# Patient Record
(demographics unavailable — no encounter records)

---

## 2017-02-14 NOTE — ED
General Adult HPI





- General


Source: patient, RN notes reviewed, old records reviewed


Mode of arrival: ambulatory


Limitations: no limitations





<Alex Bynum - Last Filed: 02/14/17 06:49>





<Alex Parsons - Last Filed: 02/14/17 10:19>





- General


Chief complaint: Abdominal Pain


Stated complaint: uretal obstruction


Time Seen by Provider: 02/14/17 06:45





- History of Present Illness


Initial comments: 





This is a 23-year-old female here for evaluation.  Patient with a back and 

flank pain.  Patient does have history of ureteral blockage, right sided 

ureteral stricture, she has follow-up with Dr. Alicia.  Patient has no 

specific fevers, no abdominal pain, no nausea vomiting.  No diarrhea.  No 

modifying factors for symptoms.  No blood in her urine, able to urinate without 

difficulty (Alex Bynum)





- Related Data


 Home Medications











 Medication  Instructions  Recorded  Confirmed


 


HYDROcodone/APAP 7.5-325MG [Norco 1 tab PO DAILY PRN 02/14/17 02/14/17





7.5-325]   











 Allergies











Allergy/AdvReac Type Severity Reaction Status Date / Time


 


No Known Allergies Allergy   Verified 02/14/17 07:42














Review of Systems


ROS Other: All systems not noted in ROS Statement are negative.





<Alex Bynum - Last Filed: 02/14/17 06:49>


ROS Other: All systems not noted in ROS Statement are negative.





<Alex Parsons - Last Filed: 02/14/17 10:19>


ROS Statement: 


Those systems with pertinent positive or pertinent negative responses have been 

documented in the HPI.








Past Medical History


Additional Past Medical History / Comment(s): uretheral stricture, spontaneous 

pneumo


History of Any Multi-Drug Resistant Organisms: None Reported


Additional Past Surgical History / Comment(s): vats


Past Psychological History: No Psychological Hx Reported


Smoking Status: Never smoker


Past Alcohol Use History: Occasional


Past Drug Use History: None Reported





<Alex Bynum - Last Filed: 02/14/17 06:49>





General Exam


Limitations: no limitations





<Alex Bynum - Last Filed: 02/14/17 06:49>





Medical Decision Making





<Alex Bynum - Last Filed: 02/14/17 06:49>





- Lab Data


Result diagrams: 


 02/14/17 08:19





 02/14/17 08:19





<Alex Parsons - Last Filed: 02/14/17 10:19>





- Medical Decision Making





Spoke with Dr. Tate he was willing to accept the admission I admitted the 

patient to Dr. Alicia. (Alex Parsons)





- Lab Data





 Lab Results











  02/14/17 02/14/17 02/14/17 Range/Units





  06:35 06:35 08:19 


 


WBC     (3.8-10.6)  k/uL


 


RBC     (3.80-5.40)  m/uL


 


Hgb     (11.4-16.0)  gm/dL


 


Hct     (34.0-46.0)  %


 


MCV     (80.0-100.0)  fL


 


MCH     (25.0-35.0)  pg


 


MCHC     (31.0-37.0)  g/dL


 


RDW     (11.5-15.5)  %


 


Plt Count     (150-450)  k/uL


 


Neutrophils %     %


 


Lymphocytes %     %


 


Monocytes %     %


 


Eosinophils %     %


 


Basophils %     %


 


Neutrophils #     (1.3-7.7)  k/uL


 


Lymphocytes #     (1.0-4.8)  k/uL


 


Monocytes #     (0-1.0)  k/uL


 


Eosinophils #     (0-0.7)  k/uL


 


Basophils #     (0-0.2)  k/uL


 


Sodium    140  (137-145)  mmol/L


 


Potassium    4.2  (3.5-5.1)  mmol/L


 


Chloride    100  ()  mmol/L


 


Carbon Dioxide    25  (22-30)  mmol/L


 


Anion Gap    15  mmol/L


 


BUN    16  (7-17)  mg/dL


 


Creatinine    0.83  (0.52-1.04)  mg/dL


 


Est GFR (MDRD) Af Amer    >60  (>60 ml/min/1.73 sqM)  


 


Est GFR (MDRD) Non-Af    >60  (>60 ml/min/1.73 sqM)  


 


Glucose    97  (74-99)  mg/dL


 


Calcium    9.9  (8.4-10.2)  mg/dL


 


Total Bilirubin    0.9  (0.2-1.3)  mg/dL


 


AST    22  (14-36)  U/L


 


ALT    34  (9-52)  U/L


 


Alkaline Phosphatase    68  ()  U/L


 


Total Protein    7.6  (6.3-8.2)  g/dL


 


Albumin    4.6  (3.5-5.0)  g/dL


 


Amylase    31  ()  U/L


 


Lipase    30  ()  U/L


 


Urine Color  Light Yellow    


 


Urine Appearance  Turbid H    (Clear)  


 


Urine pH  7.5    (5.0-8.0)  


 


Ur Specific Gravity  1.013    (1.001-1.035)  


 


Urine Protein  2+ H    (Negative)  


 


Urine Glucose (UA)  Negative    (Negative)  


 


Urine Ketones  3+ H    (Negative)  


 


Urine Blood  Small H    (Negative)  


 


Urine Nitrate  Negative    (Negative)  


 


Urine Bilirubin  Negative    (Negative)  


 


Urine Urobilinogen  <2.0    (<2.0)  mg/dL


 


Ur Leukocyte Esterase  Large H    (Negative)  


 


Urine RBC  62 H    (0-5)  /hpf


 


Urine WBC  >182 H    (0-5)  /hpf


 


Ur Squamous Epith Cells  3    (0-4)  /hpf


 


Urine HCG, Qual   Not Detected   (Not Detectd)  














  02/14/17 Range/Units





  08:19 


 


WBC  10.8 H  (3.8-10.6)  k/uL


 


RBC  4.50  (3.80-5.40)  m/uL


 


Hgb  13.8  (11.4-16.0)  gm/dL


 


Hct  41.0  (34.0-46.0)  %


 


MCV  91.2  (80.0-100.0)  fL


 


MCH  30.6  (25.0-35.0)  pg


 


MCHC  33.5  (31.0-37.0)  g/dL


 


RDW  12.4  (11.5-15.5)  %


 


Plt Count  281  (150-450)  k/uL


 


Neutrophils %  90  %


 


Lymphocytes %  5  %


 


Monocytes %  4  %


 


Eosinophils %  0  %


 


Basophils %  0  %


 


Neutrophils #  9.7 H  (1.3-7.7)  k/uL


 


Lymphocytes #  0.5 L  (1.0-4.8)  k/uL


 


Monocytes #  0.4  (0-1.0)  k/uL


 


Eosinophils #  0.0  (0-0.7)  k/uL


 


Basophils #  0.0  (0-0.2)  k/uL


 


Sodium   (137-145)  mmol/L


 


Potassium   (3.5-5.1)  mmol/L


 


Chloride   ()  mmol/L


 


Carbon Dioxide   (22-30)  mmol/L


 


Anion Gap   mmol/L


 


BUN   (7-17)  mg/dL


 


Creatinine   (0.52-1.04)  mg/dL


 


Est GFR (MDRD) Af Amer   (>60 ml/min/1.73 sqM)  


 


Est GFR (MDRD) Non-Af   (>60 ml/min/1.73 sqM)  


 


Glucose   (74-99)  mg/dL


 


Calcium   (8.4-10.2)  mg/dL


 


Total Bilirubin   (0.2-1.3)  mg/dL


 


AST   (14-36)  U/L


 


ALT   (9-52)  U/L


 


Alkaline Phosphatase   ()  U/L


 


Total Protein   (6.3-8.2)  g/dL


 


Albumin   (3.5-5.0)  g/dL


 


Amylase   ()  U/L


 


Lipase   ()  U/L


 


Urine Color   


 


Urine Appearance   (Clear)  


 


Urine pH   (5.0-8.0)  


 


Ur Specific Gravity   (1.001-1.035)  


 


Urine Protein   (Negative)  


 


Urine Glucose (UA)   (Negative)  


 


Urine Ketones   (Negative)  


 


Urine Blood   (Negative)  


 


Urine Nitrate   (Negative)  


 


Urine Bilirubin   (Negative)  


 


Urine Urobilinogen   (<2.0)  mg/dL


 


Ur Leukocyte Esterase   (Negative)  


 


Urine RBC   (0-5)  /hpf


 


Urine WBC   (0-5)  /hpf


 


Ur Squamous Epith Cells   (0-4)  /hpf


 


Urine HCG, Qual   (Not Detectd)  














Disposition





<Alex Bynum - Last Filed: 02/14/17 06:49>


Time of Disposition: 10:19





<Alex Parsons - Last Filed: 02/14/17 10:19>


Clinical Impression: 


 Hydronephrosis, Urinary tract infection





Disposition: ADMITTED AS IP TO THIS HOSP

## 2017-02-14 NOTE — US
EXAMINATION TYPE: US renals and bladder

 

DATE OF EXAM: 2/14/2017 8:49 AM

 

COMPARISON: NONE

 

CLINICAL HISTORY: Pain. rt flank pain

 

EXAM MEASUREMENTS:

 

Right Kidney:  10.9 x 4.5 x 4.8 cm

Left Kidney: 12.5 x 5.6 x 4.6 cm

 

Findings:

 

Right Kidney: moderate/severe hydronephrosis.  Medial cystic lesion= 8.0 x 7.3 x 5.7 cm   

Left Kidney: wnl  

Bladder: distended, wnl

**Left Jet seen:

 

No nephrolithiasis.

 

IMPRESSION:

Moderate to severe right hydronephrosis. Largest cystic lesion involving the right kidney noted measu
ring 8 cm and appears to have a simple appearance.

## 2017-02-15 NOTE — P.GSHP
History of Present Illness


H&P Date: 17


Chief Complaint: Right flank pain





The patient is a 23-year-old white female with chronic right hydronephrosis, 

presumably due to UPJ obstruction.  I initially saw her in the office in , 

and a nuclear renogram was ordered for evaluation of this condition.  

Unfortunately, the renogram was nondiagnostic.  The patient was advised to 

undergo repeat renogram, but she moved to [place].  While there, she had 

another episode of right flank pain.  She underwent imaging (ultrasound and 

computed tomography scan) which confirmed the presence of right hydronephrosis.

  She was treated with analgesics and her pain resolved.  Last night, she was 

awakened with severe right flank pain.  She took oral pain medication, and had 

an episode of emesis.  She subsequently presented to the emergency room.  An 

ultrasound showed moderate to severe right hydronephrosis, and she was 

admitted.  She has received Toradol and her pain is now controlled.





- Constitutional


Constitutional: Denies chills, Denies fever





- Genitourinary (Female)


Genitourinary: Denies dysuria, Denies hematuria





Past Medical History


Additional Past Medical History / Comment(s): Right uretheral stricture, left 

spontaneous pneumothorax x 3, jaundice as .


History of Any Multi-Drug Resistant Organisms: None Reported


Additional Past Surgical History / Comment(s): Left vats with wedge resection.


Past Anesthesia/Blood Transfusion Reactions: Postoperative Nausea & Vomiting (

PONV)


Additional Past Anesthesia/Blood Transfusion Reaction / Comment(s): Slow to 

awaken.


Past Psychological History: No Psychological Hx Reported


Additional Psychological History / Comment(s): Pt resides alone.  She is 

independent.


Smoking Status: Never smoker


Past Alcohol Use History: Occasional


Past Drug Use History: None Reported





- Past Family History


  ** Mother


Family Medical History: No Reported History





  ** Father


Family Medical History: No Reported History





Medications and Allergies


 Home Medications











 Medication  Instructions  Recorded  Confirmed  Type


 


HYDROcodone/APAP 7.5-325MG [Norco 1 tab PO DAILY PRN 17 History





7.5-325]    











 Allergies











Allergy/AdvReac Type Severity Reaction Status Date / Time


 


No Known Allergies Allergy   Verified 17 07:42














Surgical - Exam


 Vital Signs











Temp Pulse Resp BP Pulse Ox


 


 99.0 F   59 L  18   154/74   100 


 


 17 06:32  17 06:32  17 06:32  17 06:32  17 06:32














- General


well developed, well nourished, no distress





- Respiratory


normal respiratory effort





- Abdomen


Abdomen: soft, tender (Mild right-sided tenderness), no guarding, no rigid, no 

rebound





Results





- Labs





 17 08:19





 17 08:19





- Imaging


US - kidney/bladder: report reviewed





Assessment and Plan


(1) Hydronephrosis


Status: Acute   


Plan: 





The patient is a 23-year-old white female who presents with right flank pain.  

She has had 2 prior similar episodes.  She is receiving Toradol, and her pain 

is now controlled.  I suspect that the hydronephrosis is due to UPJ 

obstruction.  I had a detailed conversation with the patient regarding this.  

If her pain is intractable, she will require placement of a right ureteral 

stent.  A right retrograde pyelogram will be performed at that time, and would 

likely confirm the presence of UPJ obstruction.  However, if her symptoms are 

tolerable, it would be my preference not to place a stent but instead to obtain 

a Lasix renogram.  This will assess the function and drainage of the right 

kidney.  The rationale for this approach was discussed in detail with the 

patient, and she is agreeable to this.  She will be made nothing by mouth after 

midnight in the event that she has intractable symptoms tomorrow morning.  

However, if she is feeling well overnight and tomorrow, she will likely be 

discharged home on oral antibiotics, pending the urine culture, as urinalysis 

is suggestive of a possible UTI.  


Time with Patient: Greater than 30

## 2017-02-15 NOTE — P.DS
Providers


Date of admission: 


02/14/17 10:22





Expected date of discharge: 02/15/17


Attending physician: 


Kyle Tate





Primary care physician: 


Stated None








- Discharge Diagnosis(es)


(1) Hydronephrosis


Current Visit: Yes   Status: Acute   Priority: High   


Hospital Course: 





The patient was admitted with severe right flank pain.  Ultrasound showed 

moderate to severe right hydronephrosis.  She was treated with IV hydration and 

parenteral analgesics, along with Levaquin.  Her pain improved significantly 

after receiving Toradol.  She was comfortable at the time of discharge.  She 

remained afebrile with stable vital signs.  A preliminary urine culture has 

shown gram-negative bacilli.


Pertinent Studies: 





Renal ultrasound showed moderate to severe right hydronephrosis.


Patient Condition at Discharge: Good





Plan - Discharge Summary


New Discharge Prescriptions: 


Ciprofloxacin HCl [Cipro] 500 mg PO Q12HR #14 tablet


Ketorolac [Toradol] 10 mg PO Q6HR #20 tab


Discharge Medication List





HYDROcodone/APAP 7.5-325MG [Norco 7.5-325] 1 tab PO DAILY PRN 02/14/17 [History]


Ciprofloxacin HCl [Cipro] 500 mg PO Q12HR #14 tablet 02/15/17 [Rx]


Ketorolac [Toradol] 10 mg PO Q6HR #20 tab 02/15/17 [Rx]








Follow up Appointment(s)/Referral(s): 


None,Stated [Primary Care Provider] - 1-2 days


Kyle Tate MD [STAFF PHYSICIAN] - 2 Weeks


Activity/Diet/Wound Care/Special Instructions: 


Diet as tolerated.  Activities as tolerated.

## 2017-03-09 NOTE — NM
EXAMINATION TYPE: NM lasix renogram

 

DATE OF EXAM: 3/9/2017 3:15 PM

 

COMPARISON: NONE

 

HISTORY:

 

Following administration of 10.4 mCi Tc 99m MAG3 with 20mg Lasix. Immediate images post injection

 

FINDINGS: 

 

Left: 90%. 

Right: 10%. 

 

Max renal flow left: 3 minutes. 

Max renal flow right: Abnormal curve.

 

Satisfactory accumulation of radiotracer within both renal collecting systems. After the administrati
on of Lasix, there is prompt excretion from left renal collecting system.

 

T 1/2 left: 20 minutes. 

T 1/2 right: Abnormal curve

 

 

IMPRESSION: 

1. High-grade obstruction with abnormal renal curves on the right. Little flow within the right kidne
y is identified findings are compatible with atrophy and/or obstruction. Renal curves on the right ar
e abnormal.

2. Left kidney function appears normal.

## 2018-11-29 NOTE — USB
Reason for exam: clinical finding.



History:

Family history of breast cancer in grandfather at age 55.



Physical Findings:

Nurse Summary: firm movable nodule (nurse sedrick).



US Breast LT

Left complete breast ultrasound includes all four quadrants, the retroareolar 

region and axilla. Finding demonstrates no cystic or solid lesion seen. Palpable 

area at 9 o'clock zone B/C. Prominent underlying rib is seen.



These results were verbally communicated with the patient and result sheet given 

to the patient on 11/28/18.





ASSESSMENT: Negative, BI-RAD 1



RECOMMENDATION:

Routine screening mammogram of both breasts at age 40.

Manage on a clinical basis with regard to any suspicious palpable areas.